# Patient Record
Sex: FEMALE | URBAN - METROPOLITAN AREA
[De-identification: names, ages, dates, MRNs, and addresses within clinical notes are randomized per-mention and may not be internally consistent; named-entity substitution may affect disease eponyms.]

---

## 2017-03-18 ENCOUNTER — APPOINTMENT (OUTPATIENT)
Age: 47
Setting detail: DERMATOLOGY
End: 2017-03-18

## 2017-03-18 DIAGNOSIS — Z41.9 ENCOUNTER FOR PROCEDURE FOR PURPOSES OTHER THAN REMEDYING HEALTH STATE, UNSPECIFIED: ICD-10-CM

## 2017-03-18 PROCEDURE — ? COOLSCULPTING

## 2017-03-18 ASSESSMENT — LOCATION DETAILED DESCRIPTION DERM
LOCATION DETAILED: LEFT SUPERIOR LATERAL MIDBACK
LOCATION DETAILED: RIGHT SUPERIOR LATERAL MIDBACK

## 2017-03-18 ASSESSMENT — LOCATION SIMPLE DESCRIPTION DERM
LOCATION SIMPLE: RIGHT LOWER BACK
LOCATION SIMPLE: LEFT LOWER BACK

## 2017-03-18 ASSESSMENT — LOCATION ZONE DERM: LOCATION ZONE: TRUNK

## 2017-03-18 NOTE — PROCEDURE: COOLSCULPTING
Suction Settings: The suction settings were per protocol.
Post-Care Instructions: I reviewed with the patient in detail post-care instructions.
Device: Aidin
Intro: Prior to treatment, the area was cleaned with alcohol and marked out with a marking pen. The gel sheet was then applied uniformly. The applicator was applied to the skin with good contact and suction.
Consent: Written consent obtained, risks reviewed including but not limited to blistering from suction, darker or lighter pigmentary change, bruising, and/or need for multiple treatments.
Price (Use Numbers Only, No Special Characters Or $): 0 balance pt paid 1350 on 3/9/17
Applicators: CoolCore
Patient Weight-Include Units (Optional): 128.5
Location: flank (right)
Post Treatment: After treatment, the suction was turned off, and the applicator was removed from the skin. Areas were massaged for 2 minutes per protocol.
Time (Minutes): 60
Detail Level: Zone
Location: flank (left)
Indication: non-invasive fat removal

## 2017-04-07 ENCOUNTER — APPOINTMENT (OUTPATIENT)
Age: 47
Setting detail: DERMATOLOGY
End: 2017-04-07

## 2017-04-07 DIAGNOSIS — Z41.9 ENCOUNTER FOR PROCEDURE FOR PURPOSES OTHER THAN REMEDYING HEALTH STATE, UNSPECIFIED: ICD-10-CM

## 2017-04-07 PROCEDURE — ? OTHER (COSMETIC)

## 2017-04-07 NOTE — PROCEDURE: OTHER (COSMETIC)
Detail Level: Zone
Other (Free Text): HALO was not functioning at the time of visit. Patient was advised that we will need to reschedule the treatment.
Price (Use Numbers Only, No Special Characters Or $): 0

## 2017-04-24 ENCOUNTER — APPOINTMENT (OUTPATIENT)
Age: 47
Setting detail: DERMATOLOGY
End: 2017-04-24

## 2017-04-24 DIAGNOSIS — Z41.9 ENCOUNTER FOR PROCEDURE FOR PURPOSES OTHER THAN REMEDYING HEALTH STATE, UNSPECIFIED: ICD-10-CM

## 2017-04-24 PROCEDURE — ? SCITON HALO PRO

## 2017-04-24 ASSESSMENT — LOCATION ZONE DERM: LOCATION ZONE: FACE

## 2017-04-24 ASSESSMENT — LOCATION DETAILED DESCRIPTION DERM
LOCATION DETAILED: LEFT INFERIOR MEDIAL FOREHEAD
LOCATION DETAILED: INFERIOR MID FOREHEAD

## 2017-04-24 ASSESSMENT — LOCATION SIMPLE DESCRIPTION DERM
LOCATION SIMPLE: LEFT FOREHEAD
LOCATION SIMPLE: INFERIOR FOREHEAD

## 2017-04-24 NOTE — PROCEDURE: SCITON HALO PRO
Consent: Written consent obtained, risks reviewed including but not limited to crusting, scabbing, blistering, scarring, darker or lighter pigmentary change, bruising, and/or incomplete response.
2940 (Ablative) Density: 18%
Treatment Number: 1
2940 (Ablative) Density: 5%
Post-Care Instructions: I reviewed with the patient in detail post-care instructions. Patient should stay away from the sun and wear sun protection until treated areas are fully healed.
1470 (Coagulation) Density: 15%
Detail Level: Zone
Length Of Topical Anesthesia Application (Optional): 60 minutes
2940 (Ablative) Depth (Won't Render If N/A): 20 microns
Post Procedure Text: The patient tolerated the procedure well. Ice-chilled washclothes were applied to the treatment area for comfort. Post care was reviewed with the patient.
Price (Use Numbers Only, No Special Characters Or $): 0.00
Topical Anesthesia?: BLT gel (benzocaine 20%, lidocaine 6%, tetracaine 4%)
Preprocedure Text: The treatment areas were thoroughly cleaned. The treatment areas were treated using the parameters noted above.
1470 (Coagulation) Depth (Won't Render If N/A): 300 microns
Anesthesia Volume In Cc: 0

## 2017-04-24 NOTE — HPI: COSMETIC (LASER RESURFACING)
Have You Had Laser Resurfacing Before?: has not had previous treatments
Additional History: Today is TREATMENT #1 For the Face

## 2018-04-27 ENCOUNTER — APPOINTMENT (OUTPATIENT)
Age: 48
Setting detail: DERMATOLOGY
End: 2018-04-27

## 2018-04-27 DIAGNOSIS — Z41.9 ENCOUNTER FOR PROCEDURE FOR PURPOSES OTHER THAN REMEDYING HEALTH STATE, UNSPECIFIED: ICD-10-CM

## 2018-04-27 PROCEDURE — ? RECOMMENDATIONS

## 2018-04-27 PROCEDURE — ? MEDICAL CONSULTATION: COOLSCULPTING

## 2018-04-27 ASSESSMENT — LOCATION ZONE DERM: LOCATION ZONE: TRUNK

## 2018-04-27 ASSESSMENT — LOCATION DETAILED DESCRIPTION DERM: LOCATION DETAILED: PERIUMBILICAL SKIN

## 2018-04-27 ASSESSMENT — LOCATION SIMPLE DESCRIPTION DERM: LOCATION SIMPLE: ABDOMEN

## 2018-04-27 NOTE — PROCEDURE: RECOMMENDATIONS
Recommendation Preamble: The following recommendations were made during the visit: cool advantage core to lower abdomen
Detail Level: Zone

## 2018-04-27 NOTE — HPI: COSMETIC CONSULTATION
Additional History: Patient had abdomen , inner thighs and flanks treated a year ago. Today she is interested in retreating abdomen. States she lost weight since previous treatment and has noticed overall improvement

## 2018-04-30 ENCOUNTER — APPOINTMENT (OUTPATIENT)
Age: 48
Setting detail: DERMATOLOGY
End: 2018-04-30

## 2018-04-30 DIAGNOSIS — Z41.9 ENCOUNTER FOR PROCEDURE FOR PURPOSES OTHER THAN REMEDYING HEALTH STATE, UNSPECIFIED: ICD-10-CM

## 2018-04-30 PROCEDURE — ? COOLSCULPTING

## 2018-04-30 PROCEDURE — ? COSMETIC CONSULTATION: COOLSCULPTING

## 2018-04-30 ASSESSMENT — LOCATION SIMPLE DESCRIPTION DERM: LOCATION SIMPLE: ABDOMEN

## 2018-04-30 ASSESSMENT — LOCATION ZONE DERM: LOCATION ZONE: TRUNK

## 2018-04-30 ASSESSMENT — LOCATION DETAILED DESCRIPTION DERM: LOCATION DETAILED: RIGHT LATERAL ABDOMEN

## 2018-04-30 NOTE — HPI: COSMETIC (COOLSCULPTING)
previous_has_had_previous_treatments
Number Of Treatments: 1
When Was Your Last Treatment?: 03/18/2017
Additional History: Patient denies any complications with last treatment.

## 2018-09-14 ENCOUNTER — APPOINTMENT (OUTPATIENT)
Age: 48
Setting detail: DERMATOLOGY
End: 2018-09-14

## 2018-09-14 DIAGNOSIS — Z41.9 ENCOUNTER FOR PROCEDURE FOR PURPOSES OTHER THAN REMEDYING HEALTH STATE, UNSPECIFIED: ICD-10-CM

## 2018-09-14 PROCEDURE — ? JUVEDERM ULTRA XC INJECTION

## 2018-09-14 NOTE — PROCEDURE: JUVEDERM ULTRA XC INJECTION
Vermilion Lips Filler Volume In Cc: 0
Include Cannula Information In Note?: Yes
Detail Level: Detailed
Post-Care Instructions: Patient instructed to apply ice to reduce swelling.
Include Cannula Size?: 30G
Include Cannula Length?: 1 inch
Procedural Text: The filler was administered to the treatment areas noted above.
Map Statment: See Attach Map for Complete Details
Tear Troughs Filler Volume In Cc: 0.3
Anesthesia Type: 1% lidocaine with epinephrine
Expiration Date (Month Year): 07/15/2019
Price (Use Numbers Only, No Special Characters Or $): 663
Consent: Written consent obtained. Risks include but not limited to bruising, beading, irregular texture, ulceration, infection, allergic reaction, scar formation, incomplete augmentation, temporary nature, procedural pain.
Anesthesia Volume In Cc: 0.5
Use Map Statement For Sites (Optional): No
Additional Anesthesia Volume In Cc: 6
Lot #: [id number]
Filler: Juvederm Ultra XC

## 2018-09-20 ENCOUNTER — APPOINTMENT (OUTPATIENT)
Age: 48
Setting detail: DERMATOLOGY
End: 2018-09-20

## 2018-09-20 DIAGNOSIS — Z41.9 ENCOUNTER FOR PROCEDURE FOR PURPOSES OTHER THAN REMEDYING HEALTH STATE, UNSPECIFIED: ICD-10-CM

## 2018-09-20 PROCEDURE — ? JUVEDERM ULTRA XC INJECTION

## 2018-09-20 NOTE — PROCEDURE: JUVEDERM ULTRA XC INJECTION
Include Cannula Length?: 1 inch
Anesthesia Volume In Cc: 0.5
Vermilion Lips Filler Volume In Cc: 0
Expiration Date (Month Year): 07/15/2019
Consent: Written consent obtained. Risks include but not limited to bruising, beading, irregular texture, ulceration, infection, allergic reaction, scar formation, incomplete augmentation, temporary nature, procedural pain.
Additional Anesthesia Volume In Cc: 6
Map Statment: See Attach Map for Complete Details
Procedural Text: The filler was administered to the treatment areas noted above.
Anesthesia Type: 1% lidocaine with epinephrine
Lot #: [id number]
Use Map Statement For Sites (Optional): No
Detail Level: Detailed
Post-Care Instructions: Patient instructed to apply ice to reduce swelling.
Filler: Juvederm Ultra XC
Include Cannula Size?: 30G

## 2019-01-31 ENCOUNTER — APPOINTMENT (RX ONLY)
Dept: URBAN - METROPOLITAN AREA CLINIC 151 | Facility: CLINIC | Age: 49
Setting detail: DERMATOLOGY
End: 2019-01-31

## 2019-01-31 DIAGNOSIS — L64.8 OTHER ANDROGENIC ALOPECIA: ICD-10-CM | Status: INADEQUATELY CONTROLLED

## 2019-01-31 PROBLEM — K75.9 INFLAMMATORY LIVER DISEASE, UNSPECIFIED: Status: ACTIVE | Noted: 2019-01-31

## 2019-01-31 PROBLEM — L70.0 ACNE VULGARIS: Status: ACTIVE | Noted: 2019-01-31

## 2019-01-31 PROCEDURE — ? PRESCRIPTION MEDICATION MANAGEMENT

## 2019-01-31 PROCEDURE — ? COUNSELING

## 2019-01-31 PROCEDURE — ? DIAGNOSIS COMMENT

## 2019-01-31 PROCEDURE — 99213 OFFICE O/P EST LOW 20 MIN: CPT

## 2019-01-31 ASSESSMENT — LOCATION ZONE DERM
LOCATION ZONE: SCALP
LOCATION ZONE: FACE

## 2019-01-31 ASSESSMENT — LOCATION SIMPLE DESCRIPTION DERM
LOCATION SIMPLE: SUPERIOR FOREHEAD
LOCATION SIMPLE: SCALP

## 2019-01-31 ASSESSMENT — LOCATION DETAILED DESCRIPTION DERM
LOCATION DETAILED: RIGHT SUPERIOR PARIETAL SCALP
LOCATION DETAILED: SUPERIOR MID FOREHEAD
LOCATION DETAILED: LEFT CENTRAL PARIETAL SCALP

## 2019-01-31 NOTE — HPI: HAIR LOSS
How Did The Hair Loss Occur?: gradual in onset
How Severe Is Your Hair Loss?: mild
Additional History: Pt had a skin rx to Rogaine

## 2019-01-31 NOTE — PROCEDURE: PRESCRIPTION MEDICATION MANAGEMENT
Render In Strict Bullet Format?: No
Continue Regimen: Visviscal supplement
Modify Regimen: spironolactone 150mg PO QD
Otc Regimen: Rogaine 5% foam QD
Detail Level: Zone

## 2019-04-17 ENCOUNTER — RX ONLY (OUTPATIENT)
Age: 49
Setting detail: RX ONLY
End: 2019-04-17

## 2019-04-17 RX ORDER — SPIRONOLACTONE 50 MG/1
TABLET, FILM COATED ORAL
Qty: 270 | Refills: 2 | Status: ERX | COMMUNITY
Start: 2019-04-17

## 2019-08-15 ENCOUNTER — APPOINTMENT (RX ONLY)
Dept: URBAN - METROPOLITAN AREA CLINIC 151 | Facility: CLINIC | Age: 49
Setting detail: DERMATOLOGY
End: 2019-08-15

## 2019-08-15 DIAGNOSIS — L64.8 OTHER ANDROGENIC ALOPECIA: ICD-10-CM | Status: IMPROVED

## 2019-08-15 PROCEDURE — ? DIAGNOSIS COMMENT

## 2019-08-15 PROCEDURE — 99213 OFFICE O/P EST LOW 20 MIN: CPT

## 2019-08-15 PROCEDURE — ? PRESCRIPTION MEDICATION MANAGEMENT

## 2019-08-15 PROCEDURE — ? COUNSELING

## 2019-08-15 NOTE — PROCEDURE: COUNSELING
Detail Level: Simple
Patient Specific Counseling (Will Not Stick From Patient To Patient): Referred patient for PRP treatment with Dr. Demetra Grajeda

## 2019-08-15 NOTE — PROCEDURE: PRESCRIPTION MEDICATION MANAGEMENT
Render In Strict Bullet Format?: No
Continue Regimen: Visviscal supplement and spironolactone 150mg PO QD
Discontinue Regimen: Rogaine 5% foam QD (pt has tried both foam and liquid but both formulations caused her scalp to dry out, itch and develop dandruff)
Detail Level: Zone

## 2019-09-17 ENCOUNTER — APPOINTMENT (OUTPATIENT)
Age: 49
Setting detail: DERMATOLOGY
End: 2019-09-17

## 2019-09-17 DIAGNOSIS — Z41.9 ENCOUNTER FOR PROCEDURE FOR PURPOSES OTHER THAN REMEDYING HEALTH STATE, UNSPECIFIED: ICD-10-CM

## 2019-09-17 PROCEDURE — ? JUVEDERM ULTRA XC INJECTION

## 2019-09-17 NOTE — PROCEDURE: JUVEDERM ULTRA XC INJECTION
Brows Filler Volume In Cc: 0
Detail Level: Detailed
Price (Use Numbers Only, No Special Characters Or $): 878
Lot #: [id number]
Tear Troughs Filler Volume In Cc: 0.4
Procedural Text: The filler was administered to the treatment areas noted above.
Expiration Date (Month Year): 02/05/20
Include Cannula Size?: 30G
Consent: Written consent obtained. Risks include but not limited to bruising, beading, irregular texture, ulceration, infection, allergic reaction, scar formation, incomplete augmentation, temporary nature, procedural pain.
Additional Anesthesia Volume In Cc: 6
Filler: Juvederm Ultra XC
Anesthesia Volume In Cc: 0.5
Include Cannula Length?: 1 inch
Post-Care Instructions: Patient instructed to apply ice to reduce swelling.
Topical Anesthesia?: BLT cream (benzocaine 20%, lidocaine 10%, tetracaine 10%)
Include Cannula Information In Note?: No
Map Statment: See Attach Map for Complete Details

## 2020-01-14 ENCOUNTER — RX ONLY (OUTPATIENT)
Age: 50
Setting detail: RX ONLY
End: 2020-01-14

## 2020-06-23 ENCOUNTER — RX ONLY (OUTPATIENT)
Age: 50
Setting detail: RX ONLY
End: 2020-06-23

## 2020-06-23 RX ORDER — SPIRONOLACTONE 50 MG/1
TABLET, FILM COATED ORAL
Qty: 270 | Refills: 0 | Status: ERX

## 2020-09-29 ENCOUNTER — APPOINTMENT (RX ONLY)
Dept: URBAN - METROPOLITAN AREA CLINIC 151 | Facility: CLINIC | Age: 50
Setting detail: DERMATOLOGY
End: 2020-09-29

## 2020-09-29 DIAGNOSIS — L64.8 OTHER ANDROGENIC ALOPECIA: ICD-10-CM

## 2020-09-29 PROCEDURE — 99213 OFFICE O/P EST LOW 20 MIN: CPT | Mod: 95

## 2020-09-29 PROCEDURE — ? DIAGNOSIS COMMENT

## 2020-09-29 PROCEDURE — ? PRESCRIPTION MEDICATION MANAGEMENT

## 2020-09-29 PROCEDURE — ? ORDER TESTS

## 2020-09-29 PROCEDURE — ? PRESCRIPTION

## 2020-09-29 PROCEDURE — ? COUNSELING

## 2020-09-29 RX ORDER — SPIRONOLACTONE 100 MG/1
TABLET, FILM COATED ORAL
Qty: 180 | Refills: 3 | Status: ERX | COMMUNITY
Start: 2020-09-29

## 2020-09-29 RX ADMIN — SPIRONOLACTONE: 100 TABLET, FILM COATED ORAL at 00:00

## 2020-09-29 NOTE — PROCEDURE: DIAGNOSIS COMMENT
Comment: Nito MATA, improved from previous. We discussed increasing Spironolactone to 200mg daily. Patient has given us permission to mail her lab work order to her home address
Detail Level: Zone

## 2020-09-29 NOTE — PROCEDURE: PRESCRIPTION MEDICATION MANAGEMENT
Render In Strict Bullet Format?: No
Continue Regimen: Visviscal supplement
Discontinue Regimen: Rogaine 5% foam QD (pt has tried both foam and liquid but both formulations caused her scalp to dry out, itch and develop dandruff)
Detail Level: Zone
Modify Regimen: spironolactone 200mg PO Daily

## 2020-09-29 NOTE — PROCEDURE: ORDER TESTS
Bill For Surgical Tray: no
Performing Laboratory: [id number]
Lab Facility: [id number]
Billing Type: Third-Party Bill
Expected Date Of Service: 09/29/2020

## 2021-03-17 ENCOUNTER — RX ONLY (OUTPATIENT)
Age: 51
Setting detail: RX ONLY
End: 2021-03-17

## 2021-03-17 RX ORDER — SPIRONOLACTONE 100 MG/1
TABLET, FILM COATED ORAL
Qty: 180 | Refills: 3 | Status: ERX